# Patient Record
Sex: FEMALE | Race: WHITE | NOT HISPANIC OR LATINO | Employment: UNEMPLOYED | URBAN - METROPOLITAN AREA
[De-identification: names, ages, dates, MRNs, and addresses within clinical notes are randomized per-mention and may not be internally consistent; named-entity substitution may affect disease eponyms.]

---

## 2022-02-06 ENCOUNTER — HOSPITAL ENCOUNTER (EMERGENCY)
Facility: CLINIC | Age: 37
Discharge: HOME | End: 2022-02-06
Attending: EMERGENCY MEDICINE | Admitting: EMERGENCY MEDICINE
Payer: COMMERCIAL

## 2022-02-06 ENCOUNTER — APPOINTMENT (OUTPATIENT)
Dept: GENERAL RADIOLOGY | Facility: CLINIC | Age: 37
End: 2022-02-06
Attending: EMERGENCY MEDICINE
Payer: COMMERCIAL

## 2022-02-06 VITALS
SYSTOLIC BLOOD PRESSURE: 113 MMHG | HEART RATE: 80 BPM | TEMPERATURE: 98.6 F | DIASTOLIC BLOOD PRESSURE: 63 MMHG | OXYGEN SATURATION: 98 % | RESPIRATION RATE: 16 BRPM

## 2022-02-06 DIAGNOSIS — R00.2 PALPITATIONS: ICD-10-CM

## 2022-02-06 DIAGNOSIS — R07.9 CHEST PAIN, UNSPECIFIED TYPE: ICD-10-CM

## 2022-02-06 LAB
ANION GAP SERPL CALCULATED.3IONS-SCNC: 3 MMOL/L (ref 3–14)
BUN SERPL-MCNC: 17 MG/DL (ref 7–30)
CALCIUM SERPL-MCNC: 8.9 MG/DL (ref 8.5–10.1)
CHLORIDE BLD-SCNC: 109 MMOL/L (ref 94–109)
CO2 SERPL-SCNC: 27 MMOL/L (ref 20–32)
CREAT SERPL-MCNC: 0.66 MG/DL (ref 0.52–1.04)
D DIMER PPP FEU-MCNC: <0.27 UG/ML FEU (ref 0–0.5)
ERYTHROCYTE [DISTWIDTH] IN BLOOD BY AUTOMATED COUNT: 11.5 % (ref 10–15)
GFR SERPL CREATININE-BSD FRML MDRD: >90 ML/MIN/1.73M2
GLUCOSE BLD-MCNC: 106 MG/DL (ref 70–99)
HCT VFR BLD AUTO: 40.6 % (ref 35–47)
HGB BLD-MCNC: 13.3 G/DL (ref 11.7–15.7)
HOLD SPECIMEN: NORMAL
MCH RBC QN AUTO: 30.2 PG (ref 26.5–33)
MCHC RBC AUTO-ENTMCNC: 32.8 G/DL (ref 31.5–36.5)
MCV RBC AUTO: 92 FL (ref 78–100)
PLATELET # BLD AUTO: 206 10E3/UL (ref 150–450)
POTASSIUM BLD-SCNC: 3.7 MMOL/L (ref 3.4–5.3)
RBC # BLD AUTO: 4.4 10E6/UL (ref 3.8–5.2)
SODIUM SERPL-SCNC: 139 MMOL/L (ref 133–144)
TROPONIN I SERPL HS-MCNC: 7 NG/L
WBC # BLD AUTO: 5.6 10E3/UL (ref 4–11)

## 2022-02-06 PROCEDURE — 84484 ASSAY OF TROPONIN QUANT: CPT | Performed by: EMERGENCY MEDICINE

## 2022-02-06 PROCEDURE — 85379 FIBRIN DEGRADATION QUANT: CPT | Performed by: EMERGENCY MEDICINE

## 2022-02-06 PROCEDURE — 250N000013 HC RX MED GY IP 250 OP 250 PS 637: Performed by: EMERGENCY MEDICINE

## 2022-02-06 PROCEDURE — 94640 AIRWAY INHALATION TREATMENT: CPT

## 2022-02-06 PROCEDURE — 258N000003 HC RX IP 258 OP 636: Performed by: EMERGENCY MEDICINE

## 2022-02-06 PROCEDURE — 71046 X-RAY EXAM CHEST 2 VIEWS: CPT

## 2022-02-06 PROCEDURE — 36415 COLL VENOUS BLD VENIPUNCTURE: CPT | Performed by: EMERGENCY MEDICINE

## 2022-02-06 PROCEDURE — 99285 EMERGENCY DEPT VISIT HI MDM: CPT | Mod: 25

## 2022-02-06 PROCEDURE — 93005 ELECTROCARDIOGRAM TRACING: CPT

## 2022-02-06 PROCEDURE — 85027 COMPLETE CBC AUTOMATED: CPT | Performed by: EMERGENCY MEDICINE

## 2022-02-06 PROCEDURE — 80048 BASIC METABOLIC PNL TOTAL CA: CPT | Performed by: EMERGENCY MEDICINE

## 2022-02-06 RX ORDER — SUMATRIPTAN 50 MG/1
50 TABLET, FILM COATED ORAL
COMMUNITY
Start: 2021-10-20

## 2022-02-06 RX ORDER — FAMOTIDINE 20 MG/1
20 TABLET, FILM COATED ORAL 2 TIMES DAILY
Qty: 20 TABLET | Refills: 0 | Status: SHIPPED | OUTPATIENT
Start: 2022-02-06

## 2022-02-06 RX ORDER — ALBUTEROL SULFATE 90 UG/1
2 AEROSOL, METERED RESPIRATORY (INHALATION)
Status: DISPENSED | OUTPATIENT
Start: 2022-02-06 | End: 2022-02-06

## 2022-02-06 RX ADMIN — ALBUTEROL SULFATE 2 PUFF: 90 AEROSOL, METERED RESPIRATORY (INHALATION) at 21:34

## 2022-02-06 RX ADMIN — SODIUM CHLORIDE 500 ML: 9 INJECTION, SOLUTION INTRAVENOUS at 21:54

## 2022-02-06 RX ADMIN — ALBUTEROL SULFATE 2 PUFF: 90 AEROSOL, METERED RESPIRATORY (INHALATION) at 21:55

## 2022-02-06 ASSESSMENT — ENCOUNTER SYMPTOMS
COUGH: 0
SHORTNESS OF BREATH: 0
DYSURIA: 0
FEVER: 0
ABDOMINAL PAIN: 0
PALPITATIONS: 1

## 2022-02-07 NOTE — ED PROVIDER NOTES
History   Chief Complaint:  Chest Pain       HPI   Hortencia Cota is a 36 year old female, Covid+ 1/20, who presents with intermittent mid substernal chest pain for the last 5 days. States she has no pain more than she notices that it's there. She denies any clear aggravating or alleviating factors. The patient states she tried Ranitidine once with no relief. She also notes palpitations she describes as pounding and racing. She has a watch which shows a baseline resting heart rate at 53. Nine days ago her watch showed a rate of 68. Today, he heart rate was in the 90-100s. This with her other symptoms prompted her evaluation. She mentions that she had a typical migraines yesterday with 3 episodes of emesis. Regarding her LMP, she is on Mirena and is currently spotting which is unusual for her. She is Covid vaccinated with her booster on 12/5/21.  (Moderna)     Review of Systems   Constitutional: Negative for fever.   Respiratory: Negative for cough and shortness of breath.    Cardiovascular: Positive for chest pain and palpitations.   Gastrointestinal: Negative for abdominal pain.   Genitourinary: Negative for dysuria and menstrual problem.   All other systems reviewed and are negative.    Allergies:  No Known Allergies    Medications:  Mirena    Past Medical History:     Migraine      Past Surgical History:    Mammoth teeth extraction    Family History:    Father: Anxiety  Mother: Hypothyroidism, melanoma    Social History:  The patient presents to the ED alone.   PMD: Savage    Physical Exam     Patient Vitals for the past 24 hrs:   BP Temp Temp src Pulse Resp SpO2   02/06/22 2230 -- -- -- -- -- 99 %   02/06/22 2215 118/80 -- -- 85 -- 98 %   02/06/22 2200 114/63 -- -- 84 16 97 %   02/06/22 2145 114/70 -- -- 80 -- 97 %   02/06/22 2130 120/77 -- -- 74 -- 98 %   02/06/22 2115 114/67 -- -- 83 -- 97 %   02/06/22 2100 119/59 -- -- 78 -- 97 %   02/06/22 2045 125/69 -- -- 87 -- 97 %   02/06/22 2015 -- -- -- -- 12 97 %    02/06/22 2000 132/68 -- -- 90 18 97 %   02/06/22 1945 122/78 -- -- 92 11 98 %   02/06/22 1930 (!) 124/90 -- -- 93 16 98 %   02/06/22 1928 -- -- -- 90 10 100 %   02/06/22 1927 -- -- -- -- -- 100 %   02/06/22 1925 -- -- -- 100 -- 100 %   02/06/22 1909 (!) 162/110 98.6  F (37  C) Oral 113 20 100 %       Physical Exam    HENT:   Mouth/Throat:  Oral mucosa moist.   Eyes: Conjunctivae are normal. No scleral icterus.  Neck: Neck supple. No cervical adenopathy  Cardiovascular: Normal rate, regular rhythm and intact distal pulses.    Pulmonary/Chest: Effort normal and breath sounds normal. No chest wall tenderness. No overlying skin changes.   Abdominal: Soft.  No distension. There is no tenderness.   Musculoskeletal:  No edema, No calf tenderness  Neurological:Alert and oriented. Coordination normal. Symmetric strength in all 4 extremities.   Skin: Skin is warm and dry.   Psychiatric: Normal mood and mildly anxious affect.     Emergency Department Course   ECG  ECG obtained at 1917, ECG read at 1933  Normal sinus rhythm with sinus arrhythmia. Normal ECG.  Rate 97 bpm. UT interval 144 ms. QRS duration 76 ms. QT/QTc 324/411 ms. P-R-T axes 59 23 40.     Imaging:  XR Chest 2 Views   Final Result   IMPRESSION: Negative chest.        Report per radiology    Laboratory:  Labs Ordered and Resulted from Time of ED Arrival to Time of ED Departure   BASIC METABOLIC PANEL - Abnormal       Result Value    Sodium 139      Potassium 3.7      Chloride 109      Carbon Dioxide (CO2) 27      Anion Gap 3      Urea Nitrogen 17      Creatinine 0.66      Calcium 8.9      Glucose 106 (*)     GFR Estimate >90     TROPONIN I - Normal    Troponin I High Sensitivity 7     CBC WITH PLATELETS - Normal    WBC Count 5.6      RBC Count 4.40      Hemoglobin 13.3      Hematocrit 40.6      MCV 92      MCH 30.2      MCHC 32.8      RDW 11.5      Platelet Count 206     D DIMER QUANTITATIVE - Normal    D-Dimer Quantitative <0.27          Emergency Department  Course:         Reviewed:  I reviewed nursing notes, vitals, past medical history and Care Everywhere    Assessments:  1947 I obtained history and examined the patient as noted above.     2120 I rechecked the patient and explained findings. We discussed possible causes for the pain. She was amenable to trial of albuterol.    2225 She reported no change after the albuterol.    Interventions:  2134 Albuterol inhaler 2 puff  2154 0.9% sodium chloride bolus 500 ml IV  2155 Albuterol inhaler 2 puff    Disposition:  The patient was discharged to home.     Impression & Plan     Medical Decision Making:  Hortencia Cota is a 36 year old female who presents with chest pain and elevated heart rate as noted above.  EKG revealed no concerning interval changes or ischemic changes.  She did have a borderline tachycardia.  Troponin was negative and D-dimer was negative.  She has had a normal hemoglobin. She had a thyroid study performed last fall which was normal and therefore this was not repeated today. Chest x-ray was unremarkable.  At this point we have ruled out the dangerous causes for her chest pain. We considered reactive airway disease is a risk result of the recent Covid as a potential cause for her symptoms although she had no change in her symptoms with a trial of albuterol inhaler. Noticed discussed the possibility of GERD or gastritis and therefore we will trial Pepcid at home. She has been evaluated for anxiety in the past and does appear mildly anxious here today which may also be contributing to her symptoms. With reasonable clinical certainty that she is safe for discharge home with ongoing evaluation and management as an outpatient.  Recommended close follow-up in clinic in 2 days for ongoing evaluation and management and returning to the emergency department with new or worsening symptoms.    Diagnosis:    ICD-10-CM    1. Chest pain, unspecified type  R07.9    2. Palpitations  R00.2        Discharge  Medications:  New Prescriptions    FAMOTIDINE (PEPCID) 20 MG TABLET    Take 1 tablet (20 mg) by mouth 2 times daily       Scribe Disclosure:  I, Courtney Mckeon, am serving as a scribe at 7:34 PM on 2/6/2022 to document services personally performed by Maia Fernandes* based on my observations and the provider's statements to me.     I, Sandeep Hogue, am serving as a scribe  at 10:32 PM on 2/6/2022 to document services personally performed by Maia Fernandes MD based on my observations and the provider's statements to me.                Maia Fernandes MD  02/06/22 3181

## 2022-02-07 NOTE — ED TRIAGE NOTES
Covid sx 1/17, positive result 1/20. Intermit CP (worse with inspiration) and palpitations started 2/1, HR 95-115s per apple watch. Migraines on and off. Neck started hurting yesterday and radiating down into left shoulder.

## 2022-02-07 NOTE — DISCHARGE INSTRUCTIONS
Discharge Instructions  Chest Pain    You have been seen today for chest pain or discomfort.  At this time, your provider has found no signs that your chest pain is due to a serious or life-threatening condition, (or you have declined more testing and/or admission to the hospital). However, sometimes there is a serious problem that does not show up right away. Your evaluation today may not be complete and you may need further testing and evaluation.     Generally, every Emergency Department visit should have a follow-up clinic visit with either a primary or a specialty clinic/provider. Please follow-up as instructed by your emergency provider today.  Return to the Emergency Department if:  Your chest pain changes, gets worse, starts to happen more often, or comes with less activity.  You are newly short of breath.  You get very weak or tired.  You pass out or faint.  You have any new symptoms, like fever, cough, numb legs, or you cough up blood.  You have anything else that worries you.    Until you follow-up with your regular provider, please do the following:  If you have questions, contact your regular provider.  Follow-up with your regular provider/clinic as directed; this is very important.    If you were given a prescription for medicine here today, be sure to read all of the information (including the package insert) that comes with your prescription.  This will include important information about the medicine, its side effects, and any warnings that you need to know about.  The pharmacist who fills the prescription can provide more information and answer questions you may have about the medicine.  If you have questions or concerns that the pharmacist cannot address, please call or return to the Emergency Department.       Remember that you can always come back to the Emergency Department if you are not able to see your regular provider in the amount of time listed above, if you get any new symptoms, or if  there is anything that worries you.  Discharge Instructions  Palpitations    Palpitations are an unusual awareness of your heartbeat. People often describe this as the heart skipping, fluttering, racing, irregular, or pounding. At this time, your provider has found no signs that your palpitations are due to a serious or life-threatening condition. However, sometimes there is a serious problem that does not show up right away.    Palpitations can be caused by caffeine, cigarettes, diet pills, energy drinks or supplements, other stimulants, and medications and street drugs. They can also be caused by anxiety, hormone conditions such as high thyroid, and other medical conditions. Sometimes they are a sign of abnormal rhythm in the heart. At this time, your provider did not find any dangerous cause of your symptoms.    Generally, every Emergency Department visit should have a follow-up clinic visit with either a primary or a specialty clinic/provider. Please follow-up as instructed by your emergency provider today.    Return to the Emergency Department if:  You get chest pain or tightness.  You are short of breath.  You get very weak or tired.  You pass out or faint.  Your heart rate is over 120 beats per minute for more than 10 minutes while you are resting.   You have anything else that worries you.    What can I do to help myself?  Fill any prescriptions the provider gave you and take them right away.   Follow your provider s instructions about the prescription medicines you are on. Sometimes the provider may tell you to stop taking a medicine or change the dose.  If you smoke, this may be a good time to quit! The less you can smoke, the better.  Do not use energy drinks, diet pills, or stimulants. Limit your use of caffeine.  If you were given a prescription for medicine here today, be sure to read all of the information (including the package insert) that comes with your prescription.  This will include important  information about the medicine, its side effects, and any warnings that you need to know about.  The pharmacist who fills the prescription can provide more information and answer questions you may have about the medicine.  If you have questions or concerns that the pharmacist cannot address, please call or return to the Emergency Department.     Remember that you can always come back to the Emergency Department if you are not able to see your regular provider in the amount of time listed above, if you get any new symptoms, or if there is anything that worries you.

## 2022-02-08 LAB
ATRIAL RATE - MUSE: 97 BPM
DIASTOLIC BLOOD PRESSURE - MUSE: NORMAL MMHG
INTERPRETATION ECG - MUSE: NORMAL
P AXIS - MUSE: 59 DEGREES
PR INTERVAL - MUSE: 144 MS
QRS DURATION - MUSE: 76 MS
QT - MUSE: 324 MS
QTC - MUSE: 411 MS
R AXIS - MUSE: 23 DEGREES
SYSTOLIC BLOOD PRESSURE - MUSE: NORMAL MMHG
T AXIS - MUSE: 40 DEGREES
VENTRICULAR RATE- MUSE: 97 BPM

## 2022-04-02 ENCOUNTER — HEALTH MAINTENANCE LETTER (OUTPATIENT)
Age: 37
End: 2022-04-02

## 2022-10-01 ENCOUNTER — HEALTH MAINTENANCE LETTER (OUTPATIENT)
Age: 37
End: 2022-10-01

## 2023-02-05 ENCOUNTER — HEALTH MAINTENANCE LETTER (OUTPATIENT)
Age: 38
End: 2023-02-05

## 2023-07-11 NOTE — H&P (VIEW-ONLY)
[facility]      Preoperative Consultation   Hortencia Cota   : 1985   Gender: female    Date of Encounter: 2023    Nursing Notes:   Nargis Doyle  2023 12:59 PM  Signed  Chief Complaint   Patient presents with     Preoperative Exam     DOS: 23    Hortencia Cota is a 37 y.o. female (1985) who presents for preop evaluation undergoing BILATERAL ORBITAL FAT DECOMPRESSION WITH TRANS CONJUNCTIVAL EXCISION OF BOTH RIGHT AND LEFT EYES.     Date of Surgery: 23  Surgical Specialty: Ophthalmology  [facility]  Fax number: [phone]  Surgery type: outpatient  Primary Physician: Genevieve Morelos       Health Maintenance Due   Topic Date Due     Pneumococcal series for age 19-64 (1 - PCV) Never done     COVID-19 vaccine series (4 - Moderna series) 2022       Health maintenance reviewed with patient and Mukesh Maintenance topics discussed with patient.  Not planning on getting COVID-19 booster  Will skip PCV20 currently     Is patient in need of any refills in the next 3 months? no    Patient presents for an in-person office visit: alone  Patient prefers results from today's visit by:  [facility] Account  If a phone call is needed, the preferred number is: Mobile   Home Phone [phone]   Mobile [phone]   May we leave a detailed message at this number?  Yes      Nargis Doyle, [facility] ....................  2023   12:53 PM                  History of Present Illness   Patient has surgery scheduled for 2023, bilateral orbital fat decompression with transconjunctival incision. Patient reports that she feels overall well today.    Grave's disease is stable on methimazole and follows with Endocrinology    Has history of elevated creatinine that is improving and recent microalbumin was normal and renal US negative. Working on stay well hydrated     Review of Systems   A  comprehensive review of systems was negative except for items noted in HPI.    Patient Active Problem List   Diagnosis Code     Partial tear of right subscapularis tendon S46.811A     Biceps tendinitis of right upper extremity M75.21     Heart murmur R01.1     Common migraine G43.009     Hyperlipidemia E78.5     Graves disease E05.00     Current Outpatient Medications   Medication Sig     ascorbic acid, vitamin C, (Vitamin C) 1,000 mg tablet Take 2 Tablets (2,000 mg) by mouth once daily.     Biotin 10,000 mcg capsule Take 1 Capsule (10,000 mcg) by mouth.     cetirizine (ZYRTEC) 10 mg tablet Take 1 Tablet (10 mg) by mouth once daily.     cholecalciferol, Vitamin D3, (Vitamin D-3) 5,000 unit tab tablet Take 1 Tablet (5,000 units) by mouth once daily.     lifitegrast (Xiidra) 5 % dpet 1 drop BOTH EYES TWICE A DAY     medication order composer Alpha lipoic acid once daily, unknown strength     medication order composer Magnesium. Once daily. Unknown stregth     medication order composer Multiplex vitamin once daily     methIMAzole (TAPAZOLE) 5 mg tablet Take 1 Tablet (5 mg) by mouth once daily. (Patient taking differently: Take 2.5 mg by mouth once daily.)     multivitamin (MVI) tablet Take 1 Tablet by mouth once daily.     Omega-3-DHA-EPA-Fish Oil 1,000 mg (120 mg-180 mg) cap Take 1 Capsule (1,000 mg) by mouth.     SUMAtriptan (IMITREX) 50 mg tablet Take 1 Tablet (50 mg) by mouth 2 times daily if needed for Migraine. Give at minimum 2hrs apart. Max Dose: 200mg per 24hrs.     No current facility-administered medications for this visit.   No Known Allergies  Past Surgical History:   . Laterality Date     WISDOM TEETH EXTRACTION       Social History     Tobacco Use     Smoking status: Never     Passive exposure: Never     Smokeless tobacco: Never   Vaping Use     Vaping Use: Never used   Substance Use Topics     Alcohol use: Not Currently     Comment: occ     Drug use: Never     Family History   Problem Relation Age of  "Onset     Melanoma Mother      Hypothyroidism Mother      Colonic polyp Mother      Alzheimer's disease Maternal Grandmother      Heart Disease Maternal Grandfather      Anxiety disorder Father      Good Health Brother      Bipolar disorder Paternal Grandmother      Cancer-breast No Family History      Cancer-ovarian No Family History        PAST DIFFICULTY WITH ANESTHESIA: None     Physical Exam   /62 (Cuff Site: Right Arm, Position: Sitting, Cuff Size: Adult Regular)   Pulse 67   Ht 1.639 m (5' 4.53\")   Wt 71.1 kg (156 lb 11.2 oz)   SpO2 99%   Breastfeeding No   BMI 26.46 kg/m   Body mass index is 26.46 kg/m .  General Appearance: Normal.  Head Exam: Normocephalic, without obvious abnormality.  Eye Exam: Normal external eye, conjunctiva, lids, cornea. MANJU.  Ear Exam: Normal.  Nose Exam: Normal.  OroPharynx Exam: Normal.  Neck Exam: Supple, no masses or nodes.  Thyroid Exam: Normal.  Chest/Respiratory Exam: Normal chest wall and respirations. Clear to auscultation.  Cardiovascular Exam: Regular rate and rhythm. S1, S2, no murmur, click, gallop, or rubs.  Gastrointestinal Exam: Normal.  Neurologic Exam: Normal.  Psychiatric Exam: Alert and oriented, appropriate affect.     Assessment / Plan     The Pre-Op Tool    Recommendations        Labs  Urine pregnancy test  EKG  Not indicated  Stress Testing  Not indicated    * Testing recommendations are intended to assist, but not direct, clinical decisions.       *    For minimal risk procedures, most medications (including anticoagulation) can be continued through the procedure  Take your other medications as usual prior to the procedure  Hold vitamins and/or supplements for 1 week prior to the procedure  Hold fish oil for 2 weeks prior to the procedure  Okay to take Acetaminophen (Tylenol) up until the procedure  Hold / avoid NSAIDs (e.g. ibuprofen, naproxen) prior to procedure: 2 days for ibuprofen (Advil) and 4 days for naproxen (Aleve).    * Medication " recommendations are not intended to be exhaustive; they are limited to common medications that are potentially dangerous if incorrectly managed          * High quality data supports elimination of all preoperative testing in clinically stable patients prior to cataract surgery. A study randomized 18,189 patients scheduled for elective cataract surgery to 'routine' or to no testing. Only patients with a history of recent MI were excluded. The incidence of preoperative adverse events were identical in both groups with no subgroup of patients benefiting from testing on subsequent analysis (N Engl J Med 2000; 342:168-75).     Session ID: [id number]  Endnotes and bibliography available upon request: [email or web address]    Labs: yes    Results for orders placed or performed in visit on 07/11/23   PREGNANCY URINE   Result Value Ref Range    PREGNANCY,URINE           Negative Negative   BASIC METABOLIC PANEL   Result Value Ref Range    SODIUM 140 136 - 145 mmol/L    POTASSIUM 4.9 3.5 - 5.1 mmol/L    CHLORIDE 104 98 - 107 mmol/L    CO2,TOTAL 24 22 - 29 mmol/L    ANION GAP 12 5 - 18    GLUCOSE 92 70 - 99 mg/dL    CALCIUM 9.4 8.6 - 10.0 mg/dL    BUN 16 6 - 20 mg/dL    CREATININE 0.93 (H) 0.50 - 0.90 mg/dL    BUN/CREAT RATIO 17 10 - 20    eGFR 80 (L) >90 mL/min/1.73m2   TSH   Result Value Ref Range    TSH 2.10 0.27 - 4.20 uIU/mL   T4,FREE   Result Value Ref Range    T4,FREE 1.40 0.93 - 1.70 ng/dL       ECG: no    ICD-10-CM    1. Pre-op exam  Z01.818 PREGNANCY URINE     BASIC METABOLIC PANEL     PREGNANCY URINE      2. Exophthalmos of both eyes  H05.20       3. Graves disease  E05.00 methIMAzole (TAPAZOLE) 5 mg tablet  Follows with Endocrinology    Patient is cleared for surgery    Medication recommendations as above    Testing shows negative UPT and stable BMP    No personal or family history of anesthesia or bleeding problems          Patient is cleared for planned procedure.   Electronically Signed by:     [place]  VICTORINA Arndt    Patient seen with VICTORINA Manriquez and agree with documentation. I independently performed CC, HPI,  ROS, past medical history, past surgical history, social history, family history, and exam.  Note is reflective of examination and assessment/plan.    Genevieve Morelos MD 7/12/2023  7:33 AM            *Some images could not be shown.

## 2023-07-18 RX ORDER — CETIRIZINE HYDROCHLORIDE 10 MG/1
1 TABLET ORAL DAILY
COMMUNITY
Start: 2023-02-27

## 2023-07-18 RX ORDER — METHIMAZOLE 5 MG/1
2.5 TABLET ORAL
COMMUNITY
Start: 2023-02-07

## 2023-07-20 ENCOUNTER — ANESTHESIA EVENT (OUTPATIENT)
Dept: SURGERY | Facility: CLINIC | Age: 38
End: 2023-07-20
Payer: COMMERCIAL

## 2023-07-21 ENCOUNTER — HOSPITAL ENCOUNTER (OUTPATIENT)
Facility: CLINIC | Age: 38
Discharge: HOME | End: 2023-07-21
Attending: OPHTHALMOLOGY | Admitting: OPHTHALMOLOGY
Payer: COMMERCIAL

## 2023-07-21 ENCOUNTER — ANESTHESIA (OUTPATIENT)
Dept: SURGERY | Facility: CLINIC | Age: 38
End: 2023-07-21
Payer: COMMERCIAL

## 2023-07-21 VITALS
BODY MASS INDEX: 26.63 KG/M2 | TEMPERATURE: 97.8 F | HEART RATE: 60 BPM | RESPIRATION RATE: 16 BRPM | WEIGHT: 156 LBS | SYSTOLIC BLOOD PRESSURE: 120 MMHG | OXYGEN SATURATION: 100 % | HEIGHT: 64 IN | DIASTOLIC BLOOD PRESSURE: 75 MMHG

## 2023-07-21 DIAGNOSIS — H57.89 THYROID EYE DISEASE: Primary | ICD-10-CM

## 2023-07-21 DIAGNOSIS — E07.9 THYROID EYE DISEASE: Primary | ICD-10-CM

## 2023-07-21 PROCEDURE — 250N000009 HC RX 250: Performed by: NURSE ANESTHETIST, CERTIFIED REGISTERED

## 2023-07-21 PROCEDURE — 250N000025 HC SEVOFLURANE, PER MIN: Performed by: OPHTHALMOLOGY

## 2023-07-21 PROCEDURE — 370N000017 HC ANESTHESIA TECHNICAL FEE, PER MIN: Performed by: OPHTHALMOLOGY

## 2023-07-21 PROCEDURE — 360N000076 HC SURGERY LEVEL 3, PER MIN: Performed by: OPHTHALMOLOGY

## 2023-07-21 PROCEDURE — 272N000001 HC OR GENERAL SUPPLY STERILE: Performed by: OPHTHALMOLOGY

## 2023-07-21 PROCEDURE — 250N000011 HC RX IP 250 OP 636: Performed by: NURSE ANESTHETIST, CERTIFIED REGISTERED

## 2023-07-21 PROCEDURE — C1757 CATH, THROMBECTOMY/EMBOLECT: HCPCS | Performed by: OPHTHALMOLOGY

## 2023-07-21 PROCEDURE — 710N000010 HC RECOVERY PHASE 1, LEVEL 2, PER MIN: Performed by: OPHTHALMOLOGY

## 2023-07-21 PROCEDURE — 250N000011 HC RX IP 250 OP 636: Mod: JZ | Performed by: ANESTHESIOLOGY

## 2023-07-21 PROCEDURE — 999N000141 HC STATISTIC PRE-PROCEDURE NURSING ASSESSMENT: Performed by: OPHTHALMOLOGY

## 2023-07-21 PROCEDURE — 250N000009 HC RX 250: Performed by: OPHTHALMOLOGY

## 2023-07-21 PROCEDURE — 250N000013 HC RX MED GY IP 250 OP 250 PS 637: Performed by: OPHTHALMOLOGY

## 2023-07-21 PROCEDURE — 710N000012 HC RECOVERY PHASE 2, PER MINUTE: Performed by: OPHTHALMOLOGY

## 2023-07-21 PROCEDURE — 258N000003 HC RX IP 258 OP 636: Performed by: ANESTHESIOLOGY

## 2023-07-21 RX ORDER — FENTANYL CITRATE 50 UG/ML
25 INJECTION, SOLUTION INTRAMUSCULAR; INTRAVENOUS EVERY 5 MIN PRN
Status: DISCONTINUED | OUTPATIENT
Start: 2023-07-21 | End: 2023-07-21 | Stop reason: HOSPADM

## 2023-07-21 RX ORDER — ONDANSETRON 4 MG/1
4 TABLET, ORALLY DISINTEGRATING ORAL EVERY 30 MIN PRN
Status: DISCONTINUED | OUTPATIENT
Start: 2023-07-21 | End: 2023-07-21 | Stop reason: HOSPADM

## 2023-07-21 RX ORDER — FENTANYL CITRATE 50 UG/ML
INJECTION, SOLUTION INTRAMUSCULAR; INTRAVENOUS PRN
Status: DISCONTINUED | OUTPATIENT
Start: 2023-07-21 | End: 2023-07-21

## 2023-07-21 RX ORDER — HYDROCODONE BITARTRATE AND ACETAMINOPHEN 5; 325 MG/1; MG/1
1 TABLET ORAL EVERY 6 HOURS PRN
Status: DISCONTINUED | OUTPATIENT
Start: 2023-07-21 | End: 2023-07-21 | Stop reason: HOSPADM

## 2023-07-21 RX ORDER — OXYCODONE HYDROCHLORIDE 5 MG/1
10 TABLET ORAL EVERY 4 HOURS PRN
Status: DISCONTINUED | OUTPATIENT
Start: 2023-07-21 | End: 2023-07-21 | Stop reason: HOSPADM

## 2023-07-21 RX ORDER — ERYTHROMYCIN 5 MG/G
OINTMENT OPHTHALMIC
Qty: 3.5 G | Refills: 0 | Status: SHIPPED | OUTPATIENT
Start: 2023-07-21

## 2023-07-21 RX ORDER — ONDANSETRON 2 MG/ML
4 INJECTION INTRAMUSCULAR; INTRAVENOUS EVERY 30 MIN PRN
Status: DISCONTINUED | OUTPATIENT
Start: 2023-07-21 | End: 2023-07-21 | Stop reason: HOSPADM

## 2023-07-21 RX ORDER — ERYTHROMYCIN 5 MG/G
OINTMENT OPHTHALMIC PRN
Status: DISCONTINUED | OUTPATIENT
Start: 2023-07-21 | End: 2023-07-21 | Stop reason: HOSPADM

## 2023-07-21 RX ORDER — SODIUM CHLORIDE, SODIUM LACTATE, POTASSIUM CHLORIDE, CALCIUM CHLORIDE 600; 310; 30; 20 MG/100ML; MG/100ML; MG/100ML; MG/100ML
INJECTION, SOLUTION INTRAVENOUS CONTINUOUS
Status: DISCONTINUED | OUTPATIENT
Start: 2023-07-21 | End: 2023-07-21 | Stop reason: HOSPADM

## 2023-07-21 RX ORDER — LIDOCAINE HYDROCHLORIDE 10 MG/ML
INJECTION, SOLUTION INFILTRATION; PERINEURAL PRN
Status: DISCONTINUED | OUTPATIENT
Start: 2023-07-21 | End: 2023-07-21

## 2023-07-21 RX ORDER — LIDOCAINE 40 MG/G
CREAM TOPICAL
Status: DISCONTINUED | OUTPATIENT
Start: 2023-07-21 | End: 2023-07-21 | Stop reason: HOSPADM

## 2023-07-21 RX ORDER — NALOXONE HYDROCHLORIDE 0.4 MG/ML
0.2 INJECTION, SOLUTION INTRAMUSCULAR; INTRAVENOUS; SUBCUTANEOUS
Status: DISCONTINUED | OUTPATIENT
Start: 2023-07-21 | End: 2023-07-21 | Stop reason: HOSPADM

## 2023-07-21 RX ORDER — FENTANYL CITRATE 50 UG/ML
25 INJECTION, SOLUTION INTRAMUSCULAR; INTRAVENOUS
Status: DISCONTINUED | OUTPATIENT
Start: 2023-07-21 | End: 2023-07-21 | Stop reason: HOSPADM

## 2023-07-21 RX ORDER — NALOXONE HYDROCHLORIDE 0.4 MG/ML
0.4 INJECTION, SOLUTION INTRAMUSCULAR; INTRAVENOUS; SUBCUTANEOUS
Status: DISCONTINUED | OUTPATIENT
Start: 2023-07-21 | End: 2023-07-21 | Stop reason: HOSPADM

## 2023-07-21 RX ORDER — PROPOFOL 10 MG/ML
INJECTION, EMULSION INTRAVENOUS PRN
Status: DISCONTINUED | OUTPATIENT
Start: 2023-07-21 | End: 2023-07-21

## 2023-07-21 RX ORDER — OXYCODONE HYDROCHLORIDE 5 MG/1
5 TABLET ORAL EVERY 4 HOURS PRN
Status: DISCONTINUED | OUTPATIENT
Start: 2023-07-21 | End: 2023-07-21 | Stop reason: HOSPADM

## 2023-07-21 RX ORDER — METHYLPREDNISOLONE 4 MG
8 TABLET, DOSE PACK ORAL SEE ADMIN INSTRUCTIONS
Qty: 21 TABLET | Refills: 0 | Status: SHIPPED | OUTPATIENT
Start: 2023-07-21

## 2023-07-21 RX ORDER — HYDROMORPHONE HCL IN WATER/PF 6 MG/30 ML
0.2 PATIENT CONTROLLED ANALGESIA SYRINGE INTRAVENOUS EVERY 5 MIN PRN
Status: DISCONTINUED | OUTPATIENT
Start: 2023-07-21 | End: 2023-07-21 | Stop reason: HOSPADM

## 2023-07-21 RX ORDER — NEOMYCIN POLYMYXIN B SULFATES AND DEXAMETHASONE 3.5; 10000; 1 MG/ML; [USP'U]/ML; MG/ML
SUSPENSION/ DROPS OPHTHALMIC
Qty: 5 ML | Refills: 0 | Status: SHIPPED | OUTPATIENT
Start: 2023-07-21

## 2023-07-21 RX ORDER — HYDROCODONE BITARTRATE AND ACETAMINOPHEN 5; 325 MG/1; MG/1
1-2 TABLET ORAL EVERY 4 HOURS PRN
Qty: 15 TABLET | Refills: 0 | Status: SHIPPED | OUTPATIENT
Start: 2023-07-21

## 2023-07-21 RX ORDER — DEXAMETHASONE SODIUM PHOSPHATE 10 MG/ML
INJECTION, SOLUTION INTRAMUSCULAR; INTRAVENOUS PRN
Status: DISCONTINUED | OUTPATIENT
Start: 2023-07-21 | End: 2023-07-21

## 2023-07-21 RX ORDER — GLYCOPYRROLATE 0.2 MG/ML
INJECTION, SOLUTION INTRAMUSCULAR; INTRAVENOUS PRN
Status: DISCONTINUED | OUTPATIENT
Start: 2023-07-21 | End: 2023-07-21

## 2023-07-21 RX ORDER — HYDROMORPHONE HCL IN WATER/PF 6 MG/30 ML
0.4 PATIENT CONTROLLED ANALGESIA SYRINGE INTRAVENOUS EVERY 5 MIN PRN
Status: DISCONTINUED | OUTPATIENT
Start: 2023-07-21 | End: 2023-07-21 | Stop reason: HOSPADM

## 2023-07-21 RX ORDER — FENTANYL CITRATE 50 UG/ML
50 INJECTION, SOLUTION INTRAMUSCULAR; INTRAVENOUS EVERY 5 MIN PRN
Status: DISCONTINUED | OUTPATIENT
Start: 2023-07-21 | End: 2023-07-21 | Stop reason: HOSPADM

## 2023-07-21 RX ORDER — MAGNESIUM HYDROXIDE 1200 MG/15ML
LIQUID ORAL PRN
Status: DISCONTINUED | OUTPATIENT
Start: 2023-07-21 | End: 2023-07-21 | Stop reason: HOSPADM

## 2023-07-21 RX ORDER — ONDANSETRON 2 MG/ML
INJECTION INTRAMUSCULAR; INTRAVENOUS PRN
Status: DISCONTINUED | OUTPATIENT
Start: 2023-07-21 | End: 2023-07-21

## 2023-07-21 RX ADMIN — LIDOCAINE HYDROCHLORIDE 2 ML: 10 INJECTION, SOLUTION INFILTRATION; PERINEURAL at 08:05

## 2023-07-21 RX ADMIN — PROPOFOL 200 MG: 10 INJECTION, EMULSION INTRAVENOUS at 08:05

## 2023-07-21 RX ADMIN — FENTANYL CITRATE 50 MCG: 50 INJECTION, SOLUTION INTRAMUSCULAR; INTRAVENOUS at 08:15

## 2023-07-21 RX ADMIN — DEXAMETHASONE SODIUM PHOSPHATE 10 MG: 10 INJECTION, SOLUTION INTRAMUSCULAR; INTRAVENOUS at 08:05

## 2023-07-21 RX ADMIN — FENTANYL CITRATE 50 MCG: 50 INJECTION, SOLUTION INTRAMUSCULAR; INTRAVENOUS at 08:05

## 2023-07-21 RX ADMIN — MIDAZOLAM 2 MG: 1 INJECTION INTRAMUSCULAR; INTRAVENOUS at 08:00

## 2023-07-21 RX ADMIN — ONDANSETRON 4 MG: 2 INJECTION INTRAMUSCULAR; INTRAVENOUS at 08:19

## 2023-07-21 RX ADMIN — HYDROCODONE BITARTRATE AND ACETAMINOPHEN 1 TABLET: 5; 325 TABLET ORAL at 09:58

## 2023-07-21 RX ADMIN — GLYCOPYRROLATE 0.2 MG: 0.2 INJECTION INTRAMUSCULAR; INTRAVENOUS at 08:19

## 2023-07-21 RX ADMIN — ONDANSETRON 4 MG: 2 INJECTION INTRAMUSCULAR; INTRAVENOUS at 09:57

## 2023-07-21 RX ADMIN — SODIUM CHLORIDE, POTASSIUM CHLORIDE, SODIUM LACTATE AND CALCIUM CHLORIDE: 600; 310; 30; 20 INJECTION, SOLUTION INTRAVENOUS at 07:47

## 2023-07-21 ASSESSMENT — ACTIVITIES OF DAILY LIVING (ADL)
ADLS_ACUITY_SCORE: 20
ADLS_ACUITY_SCORE: 35

## 2023-07-21 NOTE — OR NURSING
Pt VSS. Pt states that pain is tolerable. Pt yee PO fluids and crackers. Pt ambulates in room independently. Pt meets criteria for discharge. Pt escorted to car via wheelchair with spouse by transporter.

## 2023-07-21 NOTE — INTERVAL H&P NOTE
"I have reviewed the surgical (or preoperative) H&P that is linked to this encounter, and examined the patient. There are no significant changes    Clinical Conditions Present on Arrival:  Clinically Significant Risk Factors Present on Admission                  # Overweight: Estimated body mass index is 26.78 kg/m  as calculated from the following:    Height as of this encounter: 1.626 m (5' 4\").    Weight as of this encounter: 70.8 kg (156 lb).       "

## 2023-07-21 NOTE — ANESTHESIA CARE TRANSFER NOTE
Patient: Hortencia Cota    Procedure: Procedure(s):  BILATERAL ORBITAL FAT DECOMPRESSION  WITH TRANS CONJUNCTIVAL EXCISION OF BOTH RIGHT AND LEFT EYES       Diagnosis: Graves disease [E05.00]  Thyroid disease [E07.9]  Edema of both orbits [H05.223]  Diagnosis Additional Information: No value filed.    Anesthesia Type:   General     Note:    Oropharynx: oropharynx clear of all foreign objects  Level of Consciousness: drowsy  Oxygen Supplementation: face mask  Level of Supplemental Oxygen (L/min / FiO2): 5  Independent Airway: airway patency satisfactory and stable  Dentition: dentition unchanged  Vital Signs Stable: post-procedure vital signs reviewed and stable  Report to RN Given: handoff report given  Patient transferred to: PACU    Handoff Report: Identifed the Patient, Identified the Reponsible Provider, Reviewed the pertinent medical history, Discussed the surgical course, Reviewed Intra-OP anesthesia mangement and issues during anesthesia, Set expectations for post-procedure period and Allowed opportunity for questions and acknowledgement of understanding      Vitals:  Vitals Value Taken Time   /65 07/21/23 0911   Temp 36.2  C (97.1  F) 07/21/23 0911   Pulse 63 07/21/23 0912   Resp 10 07/21/23 0912   SpO2 100 % 07/21/23 0912   Vitals shown include unvalidated device data.    Electronically Signed By: PHANI MENDOZA CRNA  July 21, 2023  9:13 AM

## 2023-07-21 NOTE — DISCHARGE INSTRUCTIONS
Post-operative Eye Socket Surgery Instructions    Ophthalmic Plastic and Reconstructive Surgery-[facility]  Chel Clifton M.D.    All instructions apply to the operated side    What to expect after surgery:  There will be some swelling, bruising, and likely a black eye (even into the lower eyelids and cheeks). Also expect crusting and discharge from the eye and/or incisions.   A small amount of surface bleeding is normal for the first 48 hours after surgery.  You may notice some bloody tears for the first few days after surgery. This is normal.  Your eye(s) and eyelid(s) may be painful and tender. This is normal after surgery. Use the pain medication as prescribed. If your pain does not improve despite the medication, contact the office.  You may have some temporary double vision with both eyes open after eye socket surgery.  We will monitor this at your follow up visits.  A mild itchy sensation of the skin around the incision is normal as the wound is healing.  Warm compresses can help alleviate this.    Wound and personal care:  If a patch or bandage has been placed, please leave this in place until seen in clinic. Prevent the bandage from getting wet as much as possible.   Apply ice compresses 15 minutes on 15 minutes off while awake for the first 2 days after surgery, then switch to warm compresses 4 times a day for 1 week.  For cold packs,   cup of frozen peas placed in a Ziploc bag works well. Plan on making 4-5 packages.  For warm packs you can place a cup of dry uncooked rice in a clean cotton sock. Place sock in microwave 30 seconds to one minute. Next place the warm sock into a plastic bag and wrap the bag with clean warm wet washcloth and place over operated eye.    You may shower or wash your hair the day after surgery. Do not bathe or go swimming for 1 week to prevent contamination of your wounds.  Do not apply make-up to the eyes or eyelids for 2 weeks after surgery.    Activity  restrictions and driving:  Avoid heavy lifting (greater than 20 pounds), bending with the head down, exercise or strenuous activity for 1 week after surgery.  You may resume other activities and return to work as tolerated.  You may not resume driving until have you stopped using narcotic pain medications (such as Norco, Percocet, Tylenol #3).    Medications:  Restart all your regular home medications and eye drops today except for any blood thinners.   You may restart coumadin (Warfarin) the evening after surgery  If you take Plavix or Aspirin on a regular basis, wait for 3 days after your surgery before restarting these in order to decrease the risk of bleeding complications.  If you take rivaroxaban (Xarelto) or apixaban (Eliquis), please wait 2 days after surgery to restart the medication.  Please avoid aspirin and aspirin-like medications (Motrin, Aleve, Ibuprofen, Rosaura-Claridge etc) for 5 days to reduce the risk of bleeding. You may take Tylenol (acetaminophen) for pain.  In addition to your home medications, take the following post-operative medications as prescribed by your physician:  Apply antibiotic ointment (erythromycin/Bacitracin) to all sutures three times a day, and into the operated eye(s) at night.   Apply Maxitrol eye drops to surgical eye(s) 4 times daily for 7 days.  Take the steroid pills (Medrol dose nancy) as prescribed.  Take 1 to 2 pain pills (norco or tylenol 3 as prescribed) as needed for pain up to every 4 hours.  The pain pills may make you drowsy. You must not drive a car, operate heavy machinery or drink alcohol while taking them.  The pain pills may cause constipation and nausea. Take them with some food to prevent a stomach upset. If you continue to experience nausea, call your physician.    WARNING: All the prescription pain medications listed above contain Tylenol (acetaminophen). You must not take more than 4,000 mg of acetaminophen per 24-hour period. This is equivalent to 6  tablets of Darvocet, 8 tablets of Vicodin, or 12 tablets of Norco, Percocet or Tylenol #3. If you take other over-the-counter medications containing acetaminophen, you must take the amount of acetaminophen into account and reduce the number of prescribed pain pills accordingly.    Contact information and follow-up:  Return to the Eye Clinic for a follow-up appointment with Dr. Clifton as scheduled. If no appointment has been scheduled, call [phone] for an appointment with Dr. Clifton within 1 to 2 weeks from your date of surgery.      For severe pain, bleeding, or loss of vision, call [facility] at [phone] or [phone], 24 hours a day.

## 2023-07-21 NOTE — OP NOTE
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  Bilateral transconjunctival and left lateral wall orbital fat decompression     SURGEON:  Chel Clifton MD      ANESTHESIA:  General with local infiltration of 2% lidocaine with epinephrine      COMPLICATIONS:  None.      ESTIMATED BLOOD LOSS:  Less than 5 mL.      HISTORY:  Hortencia Cota  presented with severe exophthalmos secondary to thyroid eye disease.  After risks, benefits and alternatives to the proposed procedure were explained, informed consent was obtained.      DESCRIPTION OF PROCEDURE:  Hortencia Cota  was brought to the operating room and placed supine on the operating table.  General anesthesia was given.  The lateral canthus and the inferior fornix were infiltrated with local anesthetic on each side.  The area  was prepped and draped in the typical sterile ophthalmic fashion.  Attention was directed to the left side.  A transconjunctival incision was made in the inferior fornix using a monopolar cautery. The septum was opened and orbital fat immediately prolapsed.  This prolapsed fat was excised.  It was noted to be thickened and vascular as typical for patients with thyroid eye disease. A lateral canthal incision was made with a 15 blade for 2 cm.  Dissection was carried down through the orbicularis with monopolar cautery.  Lateral canthotomy and  cantholysis was performed in the upper and lower eyelids with the monopolar cautery.  A mini Weitlaner retractor was placed.  Dissection was carried down to the orbital rim.  [place] elevator was used to elevate the periorbita from the lateral orbital wall.  A malleable retractor was placed to protect the orbital contents.  The periorbita was opened inferolaterally and intraconal fat removed with  monopolar cautery.  Hemostasis was again obtained.  The superior and inferior enid of lateral canthal tendon were secured to the lateral orbital rim periosteum with a 4-0 Vicryl sutures.  Deep sutures of 4-0 Vicryl were used to close the orbicularis.  The skin was closed with running 6-0 plain gut suture.  Attention was directed to the right side and the same procedures were performed without the lateral canthal tendon release and inferolateral fat removal as the proptosis was not as severe on the right side.  The eyes appeared symmetric via birds eye view at the end of the case.  Hortencia ASUNCION Cota tolerated the procedures well, and left in stable condition.

## 2023-07-21 NOTE — ANESTHESIA PREPROCEDURE EVALUATION
Anesthesia Pre-Procedure Evaluation    Patient: Hortencia Cota   MRN: [id number] : 1985        Procedure : Procedure(s):  BILATERAL ORBITAL FAT DECOMPRESSION  WITH TRANS CONJUNCTIVAL EXCISION OF BOTH RIGHT AND LEFT EYES          Past Medical History:   Diagnosis Date     Pacemaker      Thyroid disease       History reviewed. No pertinent surgical history.   No Known Allergies   Social History     Tobacco Use     Smoking status: Never     Smokeless tobacco: Never   Substance Use Topics     Alcohol use: Yes     Comment: occasionally      Wt Readings from Last 1 Encounters:   23 70.8 kg (156 lb)        Anesthesia Evaluation   Pt has not had prior anesthetic         ROS/MED HX  ENT/Pulmonary:  - neg pulmonary ROS     Neurologic:  - neg neurologic ROS     Cardiovascular:  - neg cardiovascular ROS     METS/Exercise Tolerance:     Hematologic:       Musculoskeletal:  - neg musculoskeletal ROS     GI/Hepatic:  - neg GI/hepatic ROS     Renal/Genitourinary:  - neg Renal ROS     Endo:     (+) thyroid problem,  hyperthyroidism,     Psychiatric/Substance Use:       Infectious Disease:       Malignancy:       Other:            Physical Exam    Airway  airway exam normal           Respiratory Devices and Support         Dental       (+) Minor Abnormalities - some fillings, tiny chips      Cardiovascular   cardiovascular exam normal          Pulmonary   pulmonary exam normal                OUTSIDE LABS:  CBC:   Lab Results   Component Value Date    WBC 5.6 2022    HGB 13.3 2022    HCT 40.6 2022     2022     BMP:   Lab Results   Component Value Date     2022    POTASSIUM 3.7 2022    CHLORIDE 109 2022    CO2 27 2022    BUN 17 2022    CR 0.66 2022     (H) 2022     COAGS: No results found for: PTT, INR, FIBR  POC: No results found for: BGM, HCG, HCGS  HEPATIC: No results found for: ALBUMIN, PROTTOTAL, ALT, AST, GGT, ALKPHOS,  BILITOTAL, BILIDIRECT, KAROLINE  OTHER:   Lab Results   Component Value Date    MELISSA 8.9 02/06/2022       Anesthesia Plan    ASA Status:  2   NPO Status:  NPO Appropriate    Anesthesia Type: General.     - Airway: LMA   Induction: Intravenous.           Consents    Anesthesia Plan(s) and associated risks, benefits, and realistic alternatives discussed. Questions answered and patient/representative(s) expressed understanding.    - Discussed:     - Discussed with:  Patient         Postoperative Care    Pain management: Multi-modal analgesia.   PONV prophylaxis: Ondansetron (or other 5HT-3), Dexamethasone or Solumedrol     Comments:                LALA ARDON MD

## 2023-07-21 NOTE — ANESTHESIA PROCEDURE NOTES
Airway    Staff -        CRNA: Thea Kim APRN CRNA       Performed By: CRNAIndications and Patient Condition         Mask difficulty assessment: 1 - vent by mask    Final Airway Details       Final airway type: supraglottic airway    Supraglottic Airway Details        Type: LMA       Brand: Ambu AuraGain       LMA size: 4    Post intubation assessment        Placement verified by: capnometry, equal breath sounds and chest rise        Number of attempts at approach: 1       Secured with: silk tape       Ease of procedure: easy       Dentition: Unchanged and Intact

## 2023-07-21 NOTE — ANESTHESIA POSTPROCEDURE EVALUATION
Patient: Hortencia Cota    Procedure: Procedure(s):  BILATERAL ORBITAL FAT DECOMPRESSION  WITH TRANS CONJUNCTIVAL EXCISION OF BOTH RIGHT AND LEFT EYES       Anesthesia Type:  General    Note:  Disposition: Outpatient   Postop Pain Control: Uneventful            Sign Out: Well controlled pain   PONV: No   Neuro/Psych: Uneventful            Sign Out: Acceptable/Baseline neuro status   Airway/Respiratory: Uneventful            Sign Out: Acceptable/Baseline resp. status   CV/Hemodynamics: Uneventful            Sign Out: Acceptable CV status; No obvious hypovolemia; No obvious fluid overload   Other NRE: NONE   DID A NON-ROUTINE EVENT OCCUR? No           Last vitals:  Vitals Value Taken Time   /84 07/21/23 0930   Temp 36.5  C (97.7  F) 07/21/23 0930   Pulse 67 07/21/23 0931   Resp 17 07/21/23 0930   SpO2 93 % 07/21/23 0931   Vitals shown include unvalidated device data.    Electronically Signed By: LALA ARDON MD  July 21, 2023  11:26 AM

## 2024-03-03 ENCOUNTER — HEALTH MAINTENANCE LETTER (OUTPATIENT)
Age: 39
End: 2024-03-03

## 2024-05-12 ENCOUNTER — HEALTH MAINTENANCE LETTER (OUTPATIENT)
Age: 39
End: 2024-05-12

## 2025-05-18 ENCOUNTER — HEALTH MAINTENANCE LETTER (OUTPATIENT)
Age: 40
End: 2025-05-18

## (undated) DEVICE — GOWN IMPERVIOUS BREATHABLE SMART LG 89015

## (undated) DEVICE — DRSG GAUZE 4X4" TRAY 6939

## (undated) DEVICE — SYR 10ML PREFILLED 0.9% NACL INJ NOT STERILE 306547

## (undated) DEVICE — MARKER SURG SKIN STRL 77734

## (undated) DEVICE — SOL WATER IRRIG 1000ML BOTTLE 2F7114

## (undated) DEVICE — DRAPE U SPLIT 74X120" 29440

## (undated) DEVICE — CATH FOGARTY EMBOLECTOMY 6FR 80CM LATEX 120806FP

## (undated) DEVICE — ESU ELEC NDL 1" COATED/INSULATED E1465

## (undated) DEVICE — ESU PENCIL SMOKE EVAC W/ROCKER SWITCH 0703-047-000

## (undated) DEVICE — SOL NACL 0.9% IRRIG 1000ML BOTTLE 2F7124

## (undated) DEVICE — PREP POVIDONE-IODINE 10% SOLUTION 4OZ BOTTLE MDS093944

## (undated) DEVICE — SUCTION MANIFOLD NEPTUNE 2 SYS 1 PORT 702-025-000

## (undated) DEVICE — CUSTOM PACK MINOR SBA5BMNHEA

## (undated) DEVICE — NDL 27GA 1 1/2" 1188827112

## (undated) DEVICE — NDL BLUNT 18GA 1.5" W/O FILTER 305180

## (undated) DEVICE — TUBING SUCTION MEDI-VAC 1/4"X20' N620A - HE

## (undated) DEVICE — PREP PAD ALCOHOL 2PLY MED STRL APP102A

## (undated) DEVICE — PLATE GROUNDING ADULT W/CORD 9165L

## (undated) DEVICE — SYR EAR BULB 2OZ DYND70280

## (undated) DEVICE — GLOVE BIOGEL PI ULTRATOUCH G SZ 6.5 42165

## (undated) DEVICE — APPLICATOR COTTON TIP 3IN STRL 32-1521

## (undated) DEVICE — SOL NACL 0.9% IRRIG 500ML BOTTLE 2F7123

## (undated) DEVICE — SYR 05ML LL W/O NDL

## (undated) RX ORDER — LIDOCAINE HYDROCHLORIDE 10 MG/ML
INJECTION, SOLUTION EPIDURAL; INFILTRATION; INTRACAUDAL; PERINEURAL
Status: DISPENSED
Start: 2023-07-21

## (undated) RX ORDER — FENTANYL CITRATE 50 UG/ML
INJECTION, SOLUTION INTRAMUSCULAR; INTRAVENOUS
Status: DISPENSED
Start: 2023-07-21

## (undated) RX ORDER — PROPOFOL 10 MG/ML
INJECTION, EMULSION INTRAVENOUS
Status: DISPENSED
Start: 2023-07-21

## (undated) RX ORDER — DEXAMETHASONE SODIUM PHOSPHATE 10 MG/ML
INJECTION, EMULSION INTRAMUSCULAR; INTRAVENOUS
Status: DISPENSED
Start: 2023-07-21

## (undated) RX ORDER — ONDANSETRON 2 MG/ML
INJECTION INTRAMUSCULAR; INTRAVENOUS
Status: DISPENSED
Start: 2023-07-21